# Patient Record
(demographics unavailable — no encounter records)

---

## 2024-11-06 NOTE — HISTORY OF PRESENT ILLNESS
[Left] : left hand dominant [FreeTextEntry1] : Patient returns for follow-up of recurrent trigger right middle finger.  Patient received right middle trigger finger injection on 7/19/24 with some improvement.  He noticed symptoms started to recur 2 weeks ago.  No new trauma.

## 2024-11-06 NOTE — PHYSICAL EXAM
[de-identified] : Tender over right middle finger A1 pulley with triggering and a positive lift test

## 2024-11-06 NOTE — PHYSICAL EXAM
[de-identified] : Tender over right middle finger A1 pulley with triggering and a positive lift test

## 2024-11-06 NOTE — ASSESSMENT
[FreeTextEntry1] : My impression is that this patient has recurrent right middle stenosing tenosynovitis of the finger, more commonly known as a trigger digit.  I recommended that the patient undergo a trigger finger injection. The risks, benefits, and alternatives were discussed with the patient in detail. This included (but was not limited to) pain, infection, subcutaneous atrophy, skin depigmentation, elevated glucose, etc...  The patient agreed to undergo the steroid injection. Under informed consent and sterile conditions, 1/2 cc of 2% plain lidocaine  and 1/2 cc of Kenalog 10 was precisely injected into the patient's finger.   A sterile Band-Aid was placed.  It is my hope that this significantly alleviates the patient's symptoms.  When and if recurs he will call to schedule right middle finger trigger finger decompression  The risks benefits and alternatives were discussed with the patient including, but not limited to:   infection, nerve injury, need for FDS slip excision, CRPS, anesthetic block injury, persistent symptoms, scar sensitivity, PIP contracture, pulley injury, recurrence etc.  The patient would like to proceed with surgery.  Shared decision-making was undertaken

## 2025-05-12 NOTE — HISTORY OF PRESENT ILLNESS
[de-identified] : DOS: 3/19/25. [de-identified] : 7 weeks 5 days s/p Right middle finger flexor mechanism decompression. He reports that he has stiffness in the finger. He also states that he has been doing exercises on his own. [de-identified] : Patient doing well.  Incision well-healed excellent range of motion is full extension but lacks some hyperextension that he has that adjacent digits.  No triggering or locking [de-identified] : 7 weeks 5 days s/p Right middle finger flexor mechanism decompression. [de-identified] : Patient has an excellent result recommend continued home program and observation

## 2025-05-12 NOTE — HISTORY OF PRESENT ILLNESS
[de-identified] : DOS: 3/19/25. [de-identified] : 7 weeks 5 days s/p Right middle finger flexor mechanism decompression. He reports that he has stiffness in the finger. He also states that he has been doing exercises on his own. [de-identified] : Patient doing well.  Incision well-healed excellent range of motion is full extension but lacks some hyperextension that he has that adjacent digits.  No triggering or locking [de-identified] : 7 weeks 5 days s/p Right middle finger flexor mechanism decompression. [de-identified] : Patient has an excellent result recommend continued home program and observation